# Patient Record
Sex: FEMALE | Race: BLACK OR AFRICAN AMERICAN | NOT HISPANIC OR LATINO | Employment: OTHER | URBAN - METROPOLITAN AREA
[De-identification: names, ages, dates, MRNs, and addresses within clinical notes are randomized per-mention and may not be internally consistent; named-entity substitution may affect disease eponyms.]

---

## 2023-09-21 DIAGNOSIS — D69.6 THROMBOCYTOPENIA: Primary | ICD-10-CM

## 2023-12-19 ENCOUNTER — OFFICE VISIT (OUTPATIENT)
Dept: HEMATOLOGY/ONCOLOGY | Facility: CLINIC | Age: 88
End: 2023-12-19
Payer: COMMERCIAL

## 2023-12-19 VITALS
TEMPERATURE: 98 F | HEART RATE: 78 BPM | WEIGHT: 163 LBS | BODY MASS INDEX: 28.88 KG/M2 | DIASTOLIC BLOOD PRESSURE: 75 MMHG | RESPIRATION RATE: 15 BRPM | OXYGEN SATURATION: 100 % | HEIGHT: 63 IN | SYSTOLIC BLOOD PRESSURE: 120 MMHG

## 2023-12-19 DIAGNOSIS — D69.6 THROMBOCYTOPENIA: ICD-10-CM

## 2023-12-19 DIAGNOSIS — B15.9 VIRAL HEPATITIS A WITHOUT HEPATIC COMA: Primary | ICD-10-CM

## 2023-12-19 DIAGNOSIS — Z86.2 HISTORY OF ANEMIA DUE TO CHRONIC KIDNEY DISEASE: ICD-10-CM

## 2023-12-19 DIAGNOSIS — N18.9 HISTORY OF ANEMIA DUE TO CHRONIC KIDNEY DISEASE: ICD-10-CM

## 2023-12-19 PROCEDURE — 1160F PR REVIEW ALL MEDS BY PRESCRIBER/CLIN PHARMACIST DOCUMENTED: ICD-10-PCS | Mod: CPTII,,, | Performed by: NURSE PRACTITIONER

## 2023-12-19 PROCEDURE — 1101F PR PT FALLS ASSESS DOC 0-1 FALLS W/OUT INJ PAST YR: ICD-10-PCS | Mod: CPTII,,, | Performed by: NURSE PRACTITIONER

## 2023-12-19 PROCEDURE — 3288F PR FALLS RISK ASSESSMENT DOCUMENTED: ICD-10-PCS | Mod: CPTII,,, | Performed by: NURSE PRACTITIONER

## 2023-12-19 PROCEDURE — 1159F PR MEDICATION LIST DOCUMENTED IN MEDICAL RECORD: ICD-10-PCS | Mod: CPTII,,, | Performed by: NURSE PRACTITIONER

## 2023-12-19 PROCEDURE — 3288F FALL RISK ASSESSMENT DOCD: CPT | Mod: CPTII,,, | Performed by: NURSE PRACTITIONER

## 2023-12-19 PROCEDURE — 99205 OFFICE O/P NEW HI 60 MIN: CPT | Mod: ,,, | Performed by: NURSE PRACTITIONER

## 2023-12-19 PROCEDURE — 1160F RVW MEDS BY RX/DR IN RCRD: CPT | Mod: CPTII,,, | Performed by: NURSE PRACTITIONER

## 2023-12-19 PROCEDURE — 1159F MED LIST DOCD IN RCRD: CPT | Mod: CPTII,,, | Performed by: NURSE PRACTITIONER

## 2023-12-19 PROCEDURE — 1126F AMNT PAIN NOTED NONE PRSNT: CPT | Mod: CPTII,,, | Performed by: NURSE PRACTITIONER

## 2023-12-19 PROCEDURE — 1101F PT FALLS ASSESS-DOCD LE1/YR: CPT | Mod: CPTII,,, | Performed by: NURSE PRACTITIONER

## 2023-12-19 PROCEDURE — 99205 PR OFFICE/OUTPT VISIT, NEW, LEVL V, 60-74 MIN: ICD-10-PCS | Mod: ,,, | Performed by: NURSE PRACTITIONER

## 2023-12-19 PROCEDURE — 1126F PR PAIN SEVERITY QUANTIFIED, NO PAIN PRESENT: ICD-10-PCS | Mod: CPTII,,, | Performed by: NURSE PRACTITIONER

## 2023-12-19 RX ORDER — FAMOTIDINE 40 MG/1
40 TABLET, FILM COATED ORAL DAILY
COMMUNITY
Start: 2023-08-17

## 2023-12-19 RX ORDER — ATORVASTATIN CALCIUM 20 MG/1
20 TABLET, FILM COATED ORAL NIGHTLY
COMMUNITY

## 2023-12-19 RX ORDER — ASPIRIN 325 MG
325 TABLET ORAL
COMMUNITY

## 2023-12-19 RX ORDER — NITROGLYCERIN 0.4 MG/1
0.4 TABLET SUBLINGUAL
COMMUNITY

## 2023-12-19 RX ORDER — CETIRIZINE HYDROCHLORIDE 10 MG/1
10 TABLET ORAL
COMMUNITY

## 2023-12-19 RX ORDER — CALCIUM ACETATE 667 MG/1
667 CAPSULE ORAL 3 TIMES DAILY
COMMUNITY
Start: 2023-09-27

## 2023-12-19 RX ORDER — HYDRALAZINE HYDROCHLORIDE 100 MG/1
100 TABLET, FILM COATED ORAL DAILY
COMMUNITY

## 2023-12-19 RX ORDER — DEXLANSOPRAZOLE 60 MG/1
1 CAPSULE, DELAYED RELEASE ORAL DAILY
COMMUNITY

## 2023-12-19 RX ORDER — FOLIC ACID/VIT B COMPLEX AND C 0.8 MG
0.8 TABLET ORAL DAILY
COMMUNITY

## 2023-12-19 NOTE — PROGRESS NOTES
Referring provider:    Dr. Burrows    Reason for consultation:    Thrombocytopenia.    Diagnosis:    Thrombocytopenia.    HPI:    Ms. Schultz is a 88-year-old female referred to our clinic for evaluation of thrombocytopenia.  Review of records reveal low platelet count in July (71,000), August (101,000), and September (66,000 to 70,000) of 2023 with upward trend in October 2023 to 126,000.  White blood count was low in July (2.91) August (3.53) 2023.  White blood count normalized in September 2023 to 4.66.  Patient was diagnosed and treated for hepatitis A a couple of months ago with repeat hepatitis A still in the grey zone at the end of October 2023.  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  Her last EGD on 8/31/2023 for complaint of melena showed hiatal hernia and gastritis.  She was placed on Dexlansoprazole for gastritis.    Interval history:    Ms. Schultz is here today for consultation regarding thrombocytopenia.  Today, she reports doing well.  She denies any epistaxis, hemoptysis, hematochezia, hematuria, melena, petechiae, or excessive bruising.  She reports recent diagnosis of hepatitis A and Chris Vera.  She reports diarrhea and abdominal pain have subsided.  She denies any fever, chills, SOB, chest pain, abdominal pain, jaundice, regular bowel movement with dark stools due to oral iron, voiding clear yellow urine, no new joint/muscle/bone pain, no lower extremity swelling, and no peripheral neuropathy.  Patient denies any autoimmune disorders, family history of thrombocytopenia, B 12, or folate deficiencies.  She denies any weight loss.  Eating and drinking well.      Family history:    Mother - Colon Cancer.      Past Medical History:   Diagnosis Date    Anemia     Arthritis     Breast cancer     Cataract     CHF (congestive heart failure)     Diastolic heart failure, unspecified HF chronicity     End stage renal disease     Hyperparathyroidism,  unspecified     Hypertension     Mixed hyperlipidemia     Unspecified sensorineural hearing loss        Past Surgical History:   Procedure Laterality Date    BREAST SURGERY Right     GALLBLADDER SURGERY      HYSTERECTOMY         Family History   Problem Relation Age of Onset    Stomach cancer Mother     Diabetes Mother     Brain cancer Father        Social History     Socioeconomic History    Marital status:    Tobacco Use    Smoking status: Never    Smokeless tobacco: Never   Substance and Sexual Activity    Alcohol use: Not Currently    Drug use: Never       Current Outpatient Medications   Medication Sig Dispense Refill    aspirin 325 MG tablet Take 325 mg by mouth as needed.      atorvastatin (LIPITOR) 20 MG tablet Take 20 mg by mouth nightly.      B complex-vitamin C-folic acid (NEPHRO-ALONZO) 0.8 mg Tab Take 0.8 mg by mouth Daily.      calcium acetate,phosphat bind, (PHOSLO) 667 mg capsule Take 667 mg by mouth 3 (three) times daily.      cetirizine (ZYRTEC) 10 MG tablet Take 10 mg by mouth as needed.      dexlansoprazole (DEXILANT) 60 mg capsule Take 1 capsule by mouth Daily.      famotidine (PEPCID) 40 MG tablet Take 40 mg by mouth Daily.      hydrALAZINE (APRESOLINE) 100 MG tablet Take 100 mg by mouth once daily.      nitroGLYCERIN (NITROSTAT) 0.4 MG SL tablet Place 0.4 mg under the tongue as needed.       No current facility-administered medications for this visit.       Review of patient's allergies indicates:   Allergen Reactions    Benazepril Swelling    Lisinopril Swelling         Review of systems  CONSTITUTIONAL: no fevers, no chills, no weight loss, no fatigue, no weakness  HEMATOLOGIC: no abnormal bleeding, no abnormal bruising, no drenching night sweats  ONCOLOGIC: no new masses or lumps  HEENT: no vision loss, no tinnitus or hearing loss, no nose bleeding, no dysphagia, no odynophagia  CVS: no chest pain, no palpitations, no dyspnea on exertion  RESP: no shortness of breath, no hemoptysis, no  cough  GI: no nausea, no vomiting, no diarrhea, no constipation, no melena, no hematochezia, no hematemesis, no abdominal pain, no increase in abdominal girth  : no dysuria, no hematuria, no hesitancy, no scrotal swelling, no discharge  INTEGUMENT: no rashes, no abnormal bruising, no nail pitting, no hyperpigmentation  NEURO: no falls, no memory loss, no paresthesias or dysesthesias, no urofecal incontinence or retention, no loss of strength on any extremity  MSK: no back pain, no new joint pain, no joint swelling  PSYCH: no suicidal or homicidal ideation, no depression, no insomnia, no anhedonia  ENDOCRINE: no heat or cold intolerance, no polyuria, no polydipsia      Physical Exam:  Vitals:    12/19/23 1123   BP: 120/75   Pulse: 78   Resp: 15   Temp: 98.2 °F (36.8 °C)       ECOG PS 0  GA: AAOx3, NAD  HEENT: NCAT, PERRLA, EOMI, good dentition, moist oral mucous membranes  LYMPH: no cervical, axillary or supraclavicular adenopathy  CVS: s1s2 RRR, no M/R/G  RESP: CTA b/l, no crackles, no wheezes or rhonchi  ABD: soft, NT, ND, BS+, no hepatosplenomegaly  EXT: no deformities, no pedal edema  SKIN: no rashes, warm and dry  NEURO: normal mentation, strength 5/5 on all 4 extremities, no sensory deficits        Assessment    Thrombocytopenia, D 69.6  Recent diagnosis of hepatitis A and Chris Vera over the last couple of months.    11/23/2023 Hepatitis A Antibody Total  Positive.  Platelet count in September 2023 - 66,000 and trending upward in October 2023 - 126,000.  Low platelet count probably related to hepatitis A and Chris Vera diagnoses.    2.  Anemia chronic disease, ESRD, D63.1  Hemoglobin in the high 10's to ll's.  Patient receives dialysis 3 days a week.  Creatinine 9.26 in October 2023.        Plan     Will send patient for labs today to include:  CBC with diff, CMP, peripheral smear, B 12, and folate.  Will order ultrasound of the abdomen to assess liver and spleen.  Advised patient to contact our office  for any abnormal bleeding, excessive bruising, and jaundice.  Continue to follow with nephrology for ESRD and dialysis.  Continue to follow with GI and PCP regarding management of hepatitis A and gastritis.  Will have patient return to our office in 3 weeks to discuss labs and ultrasound of the abdomen, and repeat CBC with differential and CMP.        A total of  60 minutes were spent in review of records, interpretation of test, coordination of care, discussion and counseling with the patient.        The patient is in agreement with today's plan of care.  All questions answered.  Patient is aware to contact our office for any problems or concerns prior to next visit.      Gillian Frost, MSN-ED, APRN, AGACNP-BC, OCN

## 2024-01-08 NOTE — PROGRESS NOTES
Referring provider:    Dr. Burrows    Reason for consultation:    Thrombocytopenia.    Diagnosis:    Thrombocytopenia.    HPI:    Ms. Schultz is a 88-year-old female referred to our clinic for evaluation of thrombocytopenia.  Review of records reveal low platelet count in July (71,000), August (101,000), and September (66,000 to 70,000) of 2023 with upward trend in October 2023 to 126,000.  White blood count was low in July (2.91) August (3.53) 2023.  White blood count normalized in September 2023 to 4.66.  Patient was diagnosed and treated for hepatitis A a couple of months ago with repeat hepatitis A still in the grey zone at the end of October 2023.  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  Her last EGD on 8/31/2023 for complaint of melena showed hiatal hernia and gastritis.  She was placed on Dexlansoprazole for gastritis.    Interval history:    1/9/2024:  Ms. Schultz is here today for follow-up and lab results from her initial visit of 12/19/2023 regarding thrombocytopenia.  Today, she reports doing well.  She denies any epistaxis, hemoptysis, hematochezia, hematuria, melena, petechiae, or excessive bruising.  She denies any fever, chills, SOB, chest pain, abdominal pain, jaundice, regular bowel movement with dark stools due to oral iron, voiding clear yellow urine, no new joint/muscle/bone pain, no lower extremity swelling, and no peripheral neuropathy.  We discussed labs dated 12/19/2023 and 1/02/2024:  12/19/2023:  Peripheral blood smear WBCs adequate with mild absolute eosinophilla.  Adequate macrocytic/normochromic red blood cell population slightly decrease in number.  Mild thrombocytopenia.  No circulating blasts.  Creatinine 6.03, BUN 22.07, Liver enzymes WNL, folic acid 65.16, Vitamin B12 (796), WBC 7.12, Hgb 112.2, Hct 37.9, Plt 123,000, 1/02/2024:  Creatinine 6.55, BUN 30.13, liver enzymes WNL, WBC 6.34, Hgb 11.0, Hct 34.2, Plt 91,000.  Weight is  stable.  Eating and drinking well.      Family history:    Mother - Colon Cancer.      Past Medical History:   Diagnosis Date    Anemia     Arthritis     Breast cancer     Cataract     CHF (congestive heart failure)     Diastolic heart failure, unspecified HF chronicity     End stage renal disease     Hyperparathyroidism, unspecified     Hypertension     Mixed hyperlipidemia     Unspecified sensorineural hearing loss        Past Surgical History:   Procedure Laterality Date    BREAST SURGERY Right     GALLBLADDER SURGERY      HYSTERECTOMY         Family History   Problem Relation Age of Onset    Stomach cancer Mother     Diabetes Mother     Brain cancer Father        Social History     Socioeconomic History    Marital status:    Tobacco Use    Smoking status: Never    Smokeless tobacco: Never   Substance and Sexual Activity    Alcohol use: Not Currently    Drug use: Never       Current Outpatient Medications   Medication Sig Dispense Refill    aspirin 325 MG tablet Take 325 mg by mouth as needed.      atorvastatin (LIPITOR) 20 MG tablet Take 20 mg by mouth nightly.      B complex-vitamin C-folic acid (NEPHRO-ALONZO) 0.8 mg Tab Take 0.8 mg by mouth Daily.      calcium acetate,phosphat bind, (PHOSLO) 667 mg capsule Take 667 mg by mouth 3 (three) times daily.      cetirizine (ZYRTEC) 10 MG tablet Take 10 mg by mouth as needed.      dexlansoprazole (DEXILANT) 60 mg capsule Take 1 capsule by mouth Daily.      famotidine (PEPCID) 40 MG tablet Take 40 mg by mouth Daily.      hydrALAZINE (APRESOLINE) 100 MG tablet Take 100 mg by mouth once daily.      methoxy peg-epoetin beta (MIRCERA INJ) Take 30 mcg by mouth as needed.      nitroGLYCERIN (NITROSTAT) 0.4 MG SL tablet Place 0.4 mg under the tongue as needed.       No current facility-administered medications for this visit.       Review of patient's allergies indicates:   Allergen Reactions    Benazepril Swelling    Lisinopril Swelling     Labs:  1/02/2024:  Creatinine  6.55, BUN 30.13, liver enzymes WNL, WBC 6.34, Hgb 11.0, Hct 34.2, Plt 91,000.     12/19/2023:  Peripheral blood smear WBCs adequate with mild absolute eosinophilla.  Adequate macrocytic/normochromic red blood cell population slightly decrease in number.  Mild thrombocytopenia.  No circulating blasts.  Creatinine 6.03, BUN 22.07, Liver enzymes WNL, folic acid 65.16, Vitamin B12 (796), WBC 7.12, Hgb 112.2, Hct 37.9, Plt 123,000,    Review of systems  CONSTITUTIONAL: no fevers, no chills, no weight loss, no fatigue, no weakness  HEMATOLOGIC: no abnormal bleeding, no abnormal bruising, no drenching night sweats  ONCOLOGIC: no new masses or lumps  HEENT: no vision loss, no tinnitus or hearing loss, no nose bleeding, no dysphagia, no odynophagia  CVS: no chest pain, no palpitations, no dyspnea on exertion  RESP: no shortness of breath, no hemoptysis, no cough  GI: no nausea, no vomiting, no diarrhea, no constipation, no melena, no hematochezia, no hematemesis, no abdominal pain, no increase in abdominal girth  : no dysuria, no hematuria, no hesitancy, no scrotal swelling, no discharge  INTEGUMENT: no rashes, no abnormal bruising, no nail pitting, no hyperpigmentation  NEURO: no falls, no memory loss, no paresthesias or dysesthesias, no urofecal incontinence or retention, no loss of strength on any extremity  MSK: no back pain, no new joint pain, no joint swelling  PSYCH: no suicidal or homicidal ideation, no depression, no insomnia, no anhedonia  ENDOCRINE: no heat or cold intolerance, no polyuria, no polydipsia      Physical Exam:  Vitals:    01/09/24 1450   BP: (!) 147/81   Pulse: 80   Resp: 20   Temp: 98.3 °F (36.8 °C)         ECOG PS 0  GA: AAOx3, NAD  HEENT: NCAT, PERRLA, EOMI, good dentition, moist oral mucous membranes  LYMPH: no cervical, axillary or supraclavicular adenopathy  CVS: s1s2 RRR, no M/R/G  RESP: CTA b/l, no crackles, no wheezes or rhonchi  ABD: soft, NT, ND, BS+, no hepatosplenomegaly  EXT: no  deformities, no pedal edema  SKIN: no rashes, warm and dry  NEURO: normal mentation, strength 5/5 on all 4 extremities, no sensory deficits        Assessment    Thrombocytopenia, D 69.6  Recent diagnosis of hepatitis A and Chris Vera over the last couple of months.    11/23/2023 Hepatitis A Antibody Total  Positive.  Platelet count in September 2023 - 66,000 and trending upward in October 2023 - 126,000.  Platelet count 12/19/2023 - 123,000 and 1/2/2024 - 91,000  US of the abdomen pending from visit of 1/9/2024.   Splenomegaly possible cause of thrombocytopenia.    2.  Anemia chronic disease, ESRD, N18.9 Z86.2  Hemoglobin in the high 10's to ll's.  Patient receives dialysis 3 days a week.  Creatinine 9.26 in October 2023.    Creatinine 12/19/2023 - 6.03, Creatinine 1/2/2024 - 6.55.    Hemoglobin 12/19/2023 - 12.2, Hemoglobin 1/2/2024 - 11.0.    Plan     Ultrasound of the abdomen to assess liver and spleen pending from 1/9/2024 visit.  2.   Advised patient to contact our office for any abnormal bleeding, excessive bruising, and jaundice.  3.   Continue to follow with nephrology for ESRD and dialysis.  4.   Continue to follow with GI and PCP regarding management of hepatitis A and gastritis.  5.   Will have patient return to our office in 3 months for follow-up, multiple myeloma panel, CBC, CMP, folate, and Vitamin B12, and to discuss labs and ultrasound of the abdomen.      The patient is in agreement with today's plan of care.  All questions answered.  Patient is aware to contact our office for any problems or concerns prior to next visit.      Gillian Frost, MSN-ED, APRN, AGACNP-BC, OCN

## 2024-01-09 ENCOUNTER — OFFICE VISIT (OUTPATIENT)
Dept: HEMATOLOGY/ONCOLOGY | Facility: CLINIC | Age: 89
End: 2024-01-09
Payer: COMMERCIAL

## 2024-01-09 VITALS
RESPIRATION RATE: 20 BRPM | OXYGEN SATURATION: 96 % | BODY MASS INDEX: 28.7 KG/M2 | HEART RATE: 80 BPM | DIASTOLIC BLOOD PRESSURE: 81 MMHG | TEMPERATURE: 98 F | WEIGHT: 162 LBS | SYSTOLIC BLOOD PRESSURE: 147 MMHG | HEIGHT: 63 IN

## 2024-01-09 DIAGNOSIS — N18.9 HISTORY OF ANEMIA DUE TO CHRONIC KIDNEY DISEASE: ICD-10-CM

## 2024-01-09 DIAGNOSIS — N18.6 ESRD ON HEMODIALYSIS: ICD-10-CM

## 2024-01-09 DIAGNOSIS — Z99.2 ESRD ON HEMODIALYSIS: ICD-10-CM

## 2024-01-09 DIAGNOSIS — Z86.2 HISTORY OF ANEMIA DUE TO CHRONIC KIDNEY DISEASE: ICD-10-CM

## 2024-01-09 DIAGNOSIS — D69.6 THROMBOCYTOPENIA: Primary | ICD-10-CM

## 2024-01-09 PROCEDURE — 1125F AMNT PAIN NOTED PAIN PRSNT: CPT | Mod: CPTII,,, | Performed by: NURSE PRACTITIONER

## 2024-01-09 PROCEDURE — 99213 OFFICE O/P EST LOW 20 MIN: CPT | Mod: ,,, | Performed by: NURSE PRACTITIONER

## 2024-01-09 PROCEDURE — 1159F MED LIST DOCD IN RCRD: CPT | Mod: CPTII,,, | Performed by: NURSE PRACTITIONER

## 2024-01-09 PROCEDURE — 3288F FALL RISK ASSESSMENT DOCD: CPT | Mod: CPTII,,, | Performed by: NURSE PRACTITIONER

## 2024-01-09 PROCEDURE — 1160F RVW MEDS BY RX/DR IN RCRD: CPT | Mod: CPTII,,, | Performed by: NURSE PRACTITIONER

## 2024-01-09 PROCEDURE — 1101F PT FALLS ASSESS-DOCD LE1/YR: CPT | Mod: CPTII,,, | Performed by: NURSE PRACTITIONER

## 2024-04-25 ENCOUNTER — OFFICE VISIT (OUTPATIENT)
Dept: HEMATOLOGY/ONCOLOGY | Facility: CLINIC | Age: 89
End: 2024-04-25
Payer: COMMERCIAL

## 2024-04-25 VITALS
RESPIRATION RATE: 18 BRPM | WEIGHT: 164 LBS | SYSTOLIC BLOOD PRESSURE: 138 MMHG | DIASTOLIC BLOOD PRESSURE: 68 MMHG | HEIGHT: 63 IN | TEMPERATURE: 99 F | BODY MASS INDEX: 29.06 KG/M2 | OXYGEN SATURATION: 99 % | HEART RATE: 77 BPM

## 2024-04-25 DIAGNOSIS — D69.6 THROMBOCYTOPENIA: ICD-10-CM

## 2024-04-25 DIAGNOSIS — N18.6 ESRD ON HEMODIALYSIS: Primary | ICD-10-CM

## 2024-04-25 DIAGNOSIS — N18.9 HISTORY OF ANEMIA DUE TO CHRONIC KIDNEY DISEASE: ICD-10-CM

## 2024-04-25 DIAGNOSIS — Z99.2 ESRD ON HEMODIALYSIS: Primary | ICD-10-CM

## 2024-04-25 DIAGNOSIS — Z86.2 HISTORY OF ANEMIA DUE TO CHRONIC KIDNEY DISEASE: ICD-10-CM

## 2024-04-25 PROCEDURE — 99213 OFFICE O/P EST LOW 20 MIN: CPT | Mod: ,,, | Performed by: NURSE PRACTITIONER

## 2024-04-25 PROCEDURE — 1159F MED LIST DOCD IN RCRD: CPT | Mod: CPTII,,, | Performed by: NURSE PRACTITIONER

## 2024-04-25 PROCEDURE — 1101F PT FALLS ASSESS-DOCD LE1/YR: CPT | Mod: CPTII,,, | Performed by: NURSE PRACTITIONER

## 2024-04-25 PROCEDURE — 3288F FALL RISK ASSESSMENT DOCD: CPT | Mod: CPTII,,, | Performed by: NURSE PRACTITIONER

## 2024-04-25 PROCEDURE — 1126F AMNT PAIN NOTED NONE PRSNT: CPT | Mod: CPTII,,, | Performed by: NURSE PRACTITIONER

## 2024-04-25 PROCEDURE — 1160F RVW MEDS BY RX/DR IN RCRD: CPT | Mod: CPTII,,, | Performed by: NURSE PRACTITIONER

## 2024-04-25 RX ORDER — RENO CAPS 100; 1.5; 1.7; 20; 10; 1; 150; 5; 6 MG/1; MG/1; MG/1; MG/1; MG/1; MG/1; UG/1; MG/1; UG/1
1 CAPSULE ORAL DAILY
COMMUNITY
Start: 2024-02-21

## 2024-04-25 RX ORDER — HYDRALAZINE HYDROCHLORIDE 50 MG/1
1 TABLET, FILM COATED ORAL 3 TIMES DAILY
COMMUNITY

## 2024-04-25 RX ORDER — LANSOPRAZOLE 30 MG/1
30 CAPSULE, DELAYED RELEASE ORAL DAILY
COMMUNITY
Start: 2024-02-23

## 2024-04-25 RX ORDER — METOPROLOL TARTRATE 25 MG/1
1 TABLET, FILM COATED ORAL 2 TIMES DAILY
COMMUNITY

## 2024-04-25 RX ORDER — POTASSIUM CHLORIDE 750 MG/1
10 CAPSULE, EXTENDED RELEASE ORAL DAILY
COMMUNITY

## 2024-04-25 NOTE — PROGRESS NOTES
Referring provider:    Dr. Burrows    Reason for consultation:    Thrombocytopenia.    Diagnosis:    Thrombocytopenia.    HPI:    Ms. Schultz is a 88-year-old female referred to our clinic for evaluation of thrombocytopenia.  Review of records reveal low platelet count in July (71,000), August (101,000), and September (66,000 to 70,000) of 2023 with upward trend in October 2023 to 126,000.  White blood count was low in July (2.91) August (3.53) 2023.  White blood count normalized in September 2023 to 4.66.  Patient was diagnosed and treated for hepatitis A a couple of months ago with repeat hepatitis A still in the grey zone at the end of October 2023.  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  Her last EGD on 8/31/2023 for complaint of melena showed hiatal hernia and gastritis.  She was placed on Dexlansoprazole for gastritis.    Interval history:    4/25/2024:  Ms. Schultz is here today for follow-up and lab results regarding thrombocytopenia.  Today, she reports doing well.  She denies any epistaxis, hemoptysis, hematochezia, hematuria, melena, petechiae, or excessive bruising.  She denies any fever, chills, SOB, chest pain, abdominal pain, jaundice, regular bowel movement with dark stools due to oral iron, voiding clear yellow urine, no new joint/muscle/bone pain, no lower extremity swelling, and no peripheral neuropathy.  Labs reviewed with patient dated 4/11/2024:  Creatinine 6.64, BUN 35.52, T. Bili WNL AST 35, ALT 36, , folic acide 14.03, Vitamin B12 741, WBC 4.23, Hgb 10.9, Hct 34.1, .3, Plt 102,000, SPEP with DESIRE IgG 1552, IgA 136, IgM 192, M-Terrell not observed, No evidence of monoclonal protein, Beta 2 Microglobulin and serum viscosity pending. Weight is stable.  Eating and drinking well.      Family history:    Mother - Colon Cancer.      Past Medical History:   Diagnosis Date    Anemia     Arthritis     Breast cancer     Cataract     CHF  (congestive heart failure)     Diastolic heart failure, unspecified HF chronicity     End stage renal disease     Hyperparathyroidism, unspecified     Hypertension     Mixed hyperlipidemia     Unspecified sensorineural hearing loss        Past Surgical History:   Procedure Laterality Date    BREAST SURGERY Right     GALLBLADDER SURGERY      HYSTERECTOMY         Family History   Problem Relation Name Age of Onset    Stomach cancer Mother      Diabetes Mother      Brain cancer Father         Social History     Socioeconomic History    Marital status:    Tobacco Use    Smoking status: Never    Smokeless tobacco: Never   Substance and Sexual Activity    Alcohol use: Not Currently    Drug use: Never       Current Outpatient Medications   Medication Sig Dispense Refill    aspirin 325 MG tablet Take 325 mg by mouth as needed.      atorvastatin (LIPITOR) 20 MG tablet Take 20 mg by mouth nightly.      B complex-vitamin C-folic acid (NEPHRO-ALONZO) 0.8 mg Tab Take 0.8 mg by mouth Daily.      calcium acetate,phosphat bind, (PHOSLO) 667 mg capsule Take 667 mg by mouth 3 (three) times daily.      cetirizine (ZYRTEC) 10 MG tablet Take 10 mg by mouth as needed.      dexlansoprazole (DEXILANT) 60 mg capsule Take 1 capsule by mouth Daily.      famotidine (PEPCID) 40 MG tablet Take 40 mg by mouth Daily.      hydrALAZINE (APRESOLINE) 50 MG tablet Take 1 tablet by mouth 3 (three) times daily.      lansoprazole (PREVACID) 30 MG capsule Take 30 mg by mouth once daily.      methoxy peg-epoetin beta (MIRCERA INJ) Take 30 mcg by mouth as needed.      metoprolol tartrate (LOPRESSOR) 25 MG tablet Take 1 tablet by mouth 2 (two) times daily.      nitroGLYCERIN (NITROSTAT) 0.4 MG SL tablet Place 0.4 mg under the tongue as needed.      potassium chloride (MICRO-K) 10 MEQ CpSR Take 10 mEq by mouth once daily.      JOSEPH CAPS 1 mg Cap Take 1 capsule by mouth once daily.       No current facility-administered medications for this visit.        Review of patient's allergies indicates:   Allergen Reactions    Ace inhibitors Anaphylaxis, Other (See Comments) and Swelling    Benazepril Swelling    Lisinopril Swelling    Vancomycin Other (See Comments)     Labs:  2024:  Creatinine 6.64, BUN 35.52, T. Bili WNL AST 35, ALT 36, , folic acide 14.03, Vitamin B12 741, WBC 4.23, Hgb 10.9, Hct 34.1, .3, Plt 102,000, SPEP with DESIRE IgG 1552, IgA 136, IgM 192, M-Terrell not observed, No evidence of monoclonal protein, Beta 2 Microglobulin and serum viscosity pending  2024:  Creatinine 6.55, BUN 30.13, liver enzymes WNL, WBC 6.34, Hgb 11.0, Hct 34.2, Plt 91,000.     2023:  Peripheral blood smear WBCs adequate with mild absolute eosinophilla.  Adequate macrocytic/normochromic red blood cell population slightly decrease in number.  Mild thrombocytopenia.  No circulating blasts.  Creatinine 6.03, BUN 22.07, Liver enzymes WNL, folic acid 65.16, Vitamin B12 (796), WBC 7.12, Hgb 112.2, Hct 37.9, Plt 123,000.    Imagin2024:  Pancreas is normal.  Liver is normal.  Right renal cysts.     Review of systems  CONSTITUTIONAL: no fevers, no chills, no weight loss, no fatigue, no weakness  HEMATOLOGIC: no abnormal bleeding, no abnormal bruising, no drenching night sweats  ONCOLOGIC: no new masses or lumps  HEENT: no vision loss, no tinnitus or hearing loss, no nose bleeding, no dysphagia, no odynophagia  CVS: no chest pain, no palpitations, no dyspnea on exertion  RESP: no shortness of breath, no hemoptysis, no cough  GI: no nausea, no vomiting, no diarrhea, no constipation, no melena, no hematochezia, no hematemesis, no abdominal pain, no increase in abdominal girth  : no dysuria, no hematuria, no hesitancy, no scrotal swelling, no discharge  INTEGUMENT: no rashes, no abnormal bruising, no nail pitting, no hyperpigmentation  NEURO: no falls, no memory loss, no paresthesias or dysesthesias, no urofecal incontinence or retention, no loss of  strength on any extremity  MSK: no back pain, no new joint pain, no joint swelling  PSYCH: no suicidal or homicidal ideation, no depression, no insomnia, no anhedonia  ENDOCRINE: no heat or cold intolerance, no polyuria, no polydipsia      Physical Exam:  Vitals:    04/25/24 1408   BP: 138/68   Pulse: 77   Resp: 18   Temp: 98.7 °F (37.1 °C)           ECOG PS 0  GA: AAOx3, NAD  HEENT: NCAT, PERRLA, EOMI, good dentition, moist oral mucous membranes  LYMPH: no cervical, axillary or supraclavicular adenopathy  CVS: s1s2 RRR, no M/R/G  RESP: CTA b/l, no crackles, no wheezes or rhonchi  ABD: soft, NT, ND, BS+, no hepatosplenomegaly  EXT: no deformities, no pedal edema  SKIN: no rashes, warm and dry  NEURO: normal mentation, strength 5/5 on all 4 extremities, no sensory deficits        Assessment    Thrombocytopenia, D 69.6  Recent diagnosis of hepatitis A and Chris Vera over the last couple of months.    11/23/2023 Hepatitis A Antibody Total  Positive.  Platelet count in September 2023 - 66,000 and trending upward in October 2023 - 126,000.  Platelet count 12/19/2023 - 123,000 and 1/2/2024 - 91,000, 4/11/2024 - 102,000      2.  Anemia chronic disease, ESRD, N18.9 Z86.2  Hemoglobin in the high 10's to ll's.  Patient receives dialysis 3 days a week.  Creatinine 9.26 in October 2023.    Creatinine 12/19/2023 - 6.03, Creatinine 1/2/2024 - 6.55.    Hemoglobin 12/19/2023 - 12.2, Hemoglobin 1/2/2024 - 11.0.   Hemogloblin 10.6 (4/11/2024).    Plan     Ultrasound of the abdomen to assess liver and spleen will repeat at next visit.  RUQ US only?.  2.   Advised patient to contact our office for any abnormal bleeding, excessive bruising, and jaundice.  3.   Continue to follow with nephrology for ESRD and dialysis.  4.   Continue to follow with GI and PCP regarding management of hepatitis A and gastritis.  5.   Will have patient return to our office in 6 months for follow-up, CBC and  CMP.      The patient is in agreement with  today's plan of care.  All questions answered.  Patient is aware to contact our office for any problems or concerns prior to next visit.      Gillian Frost, MSN-ED, APRN, AGACNP-BC, OCN

## 2024-12-03 ENCOUNTER — OFFICE VISIT (OUTPATIENT)
Dept: HEMATOLOGY/ONCOLOGY | Facility: CLINIC | Age: 89
End: 2024-12-03
Payer: COMMERCIAL

## 2024-12-03 VITALS
RESPIRATION RATE: 18 BRPM | TEMPERATURE: 98 F | OXYGEN SATURATION: 99 % | SYSTOLIC BLOOD PRESSURE: 149 MMHG | HEIGHT: 63 IN | HEART RATE: 67 BPM | DIASTOLIC BLOOD PRESSURE: 76 MMHG | BODY MASS INDEX: 29 KG/M2 | WEIGHT: 163.69 LBS

## 2024-12-03 DIAGNOSIS — D69.6 THROMBOCYTOPENIA: Primary | ICD-10-CM

## 2024-12-03 DIAGNOSIS — D64.9 ANEMIA, UNSPECIFIED TYPE: ICD-10-CM

## 2024-12-03 PROCEDURE — 99214 OFFICE O/P EST MOD 30 MIN: CPT | Mod: ,,, | Performed by: STUDENT IN AN ORGANIZED HEALTH CARE EDUCATION/TRAINING PROGRAM

## 2024-12-03 PROCEDURE — 1126F AMNT PAIN NOTED NONE PRSNT: CPT | Mod: CPTII,,, | Performed by: STUDENT IN AN ORGANIZED HEALTH CARE EDUCATION/TRAINING PROGRAM

## 2024-12-03 PROCEDURE — 1159F MED LIST DOCD IN RCRD: CPT | Mod: CPTII,,, | Performed by: STUDENT IN AN ORGANIZED HEALTH CARE EDUCATION/TRAINING PROGRAM

## 2024-12-03 RX ORDER — METOPROLOL TARTRATE 50 MG/1
1 TABLET ORAL 2 TIMES DAILY
COMMUNITY
Start: 2024-07-10

## 2024-12-03 RX ORDER — MIDODRINE HYDROCHLORIDE 10 MG/1
10 TABLET ORAL
COMMUNITY
Start: 2024-08-16

## 2024-12-03 NOTE — PROGRESS NOTES
Referring provider:    Dr. Burrows    Reason for consultation:    Thrombocytopenia.        HPI:    Ms. Schultz is a 88-year-old female referred to our clinic for evaluation of thrombocytopenia.  Review of records reveal low platelet count in July (71,000), August (101,000), and September (66,000 to 70,000) of 2023 with upward trend in October 2023 to 126,000.  White blood count was low in July (2.91) August (3.53) 2023.  White blood count normalized in September 2023 to 4.66.  Patient was diagnosed and treated for hepatitis A a couple of months ago with repeat hepatitis A still in the grey zone at the end of October 2023.  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  Her last EGD on 8/31/2023 for complaint of melena showed hiatal hernia and gastritis.  She was placed on Dexlansoprazole for gastritis.    Interval history:    Today, 12/03/2024, patient denies any acute concerns today she denies any fevers, chills, night sweats, decreased appetite or weight loss.  She denies any new medications, ER or hospital visits since last follow-up with us.  She denies any blood in his stools, dark tarry stools, easy bruising or bleeding.      Past Medical History:   Diagnosis Date    Anemia     Arthritis     Breast cancer     Cataract     CHF (congestive heart failure)     Diastolic heart failure, unspecified HF chronicity     End stage renal disease     Hyperparathyroidism, unspecified     Hypertension     Mixed hyperlipidemia     Unspecified sensorineural hearing loss        Past Surgical History:   Procedure Laterality Date    BREAST SURGERY Right     GALLBLADDER SURGERY      HYSTERECTOMY         Family History   Problem Relation Name Age of Onset    Stomach cancer Mother      Diabetes Mother      Brain cancer Father         Social History     Socioeconomic History    Marital status:    Tobacco Use    Smoking status: Never    Smokeless tobacco: Never   Substance and Sexual  Activity    Alcohol use: Not Currently    Drug use: Never       Current Outpatient Medications   Medication Sig Dispense Refill    aspirin 325 MG tablet Take 325 mg by mouth as needed.      atorvastatin (LIPITOR) 20 MG tablet Take 20 mg by mouth nightly.      B complex-vitamin C-folic acid (NEPHRO-ALONZO) 0.8 mg Tab Take 0.8 mg by mouth Daily.      calcium acetate,phosphat bind, (PHOSLO) 667 mg capsule Take 667 mg by mouth 3 (three) times daily.      cetirizine (ZYRTEC) 10 MG tablet Take 10 mg by mouth as needed.      dexlansoprazole (DEXILANT) 60 mg capsule Take 1 capsule by mouth Daily.      famotidine (PEPCID) 40 MG tablet Take 40 mg by mouth Daily.      hydrALAZINE (APRESOLINE) 50 MG tablet Take 1 tablet by mouth 3 (three) times daily.      lansoprazole (PREVACID) 30 MG capsule Take 30 mg by mouth once daily.      metoprolol tartrate (LOPRESSOR) 25 MG tablet Take 1 tablet by mouth 2 (two) times daily.      metoprolol tartrate (LOPRESSOR) 50 MG tablet Take 1 tablet by mouth 2 (two) times daily.      midodrine (PROAMATINE) 10 MG tablet Take 10 mg by mouth 3 (three) times a week.      nitroGLYCERIN (NITROSTAT) 0.4 MG SL tablet Place 0.4 mg under the tongue as needed.      potassium chloride (MICRO-K) 10 MEQ CpSR Take 10 mEq by mouth once daily.      JOSEPH CAPS 1 mg Cap Take 1 capsule by mouth once daily.       No current facility-administered medications for this visit.       Review of patient's allergies indicates:   Allergen Reactions    Ace inhibitors Anaphylaxis, Other (See Comments) and Swelling    Benazepril Swelling    Lisinopril Swelling    Vancomycin Other (See Comments)     Labs:    12/03/2024 creatinine 8.3, albumin 3.6, calcium 8.6, LFTs unremarkable, WBC count 3.8, hemoglobin 10.7, .7, platelet count 125 1000, ANC 2.09.    4/11/2024:  Creatinine 6.64, BUN 35.52, T. Bili WNL AST 35, ALT 36, , folic acide 14.03, Vitamin B12 741, WBC 4.23, Hgb 10.9, Hct 34.1, .3, Plt 102,000, SPEP with  DESIRE IgG 1552, IgA 136, IgM 192, M-Terrell not observed, No evidence of monoclonal protein, Beta 2 Microglobulin and serum viscosity pending    2024:  Creatinine 6.55, BUN 30.13, liver enzymes WNL, WBC 6.34, Hgb 11.0, Hct 34.2, Plt 91,000.     2023:  Peripheral blood smear WBCs adequate with mild absolute eosinophilla.  Adequate macrocytic/normochromic red blood cell population slightly decrease in number.  Mild thrombocytopenia.  No circulating blasts.  Creatinine 6.03, BUN 22.07, Liver enzymes WNL, folic acid 65.16, Vitamin B12 (796), WBC 7.12, Hgb 112.2, Hct 37.9, Plt 123,000.    Imagin2024:  Pancreas is normal.  Liver is normal.  Right renal cysts.     Review of systems  CONSTITUTIONAL: no fevers, no chills, no weight loss, no fatigue, no weakness  HEMATOLOGIC: no abnormal bleeding, no abnormal bruising, no drenching night sweats  ONCOLOGIC: no new masses or lumps  HEENT: no vision loss, no tinnitus or hearing loss, no nose bleeding, no dysphagia, no odynophagia  CVS: no chest pain, no palpitations, no dyspnea on exertion  RESP: no shortness of breath, no hemoptysis, no cough  GI: no nausea, no vomiting, no diarrhea, no constipation, no melena, no hematochezia, no hematemesis, no abdominal pain, no increase in abdominal girth  : no dysuria, no hematuria, no hesitancy, no scrotal swelling, no discharge  INTEGUMENT: no rashes, no abnormal bruising, no nail pitting, no hyperpigmentation  NEURO: no falls, no memory loss, no paresthesias or dysesthesias, no urofecal incontinence or retention, no loss of strength on any extremity  MSK: no back pain, no new joint pain, no joint swelling  PSYCH: no suicidal or homicidal ideation, no depression, no insomnia, no anhedonia  ENDOCRINE: no heat or cold intolerance, no polyuria, no polydipsia      Physical Exam:  Vitals:    24 1348   BP: (!) 149/76   Pulse: 67   Resp: 18   Temp: 97.7 °F (36.5 °C)       GA: AAOx3, NAD  HEENT:  moist oral mucous  membranes  RESP:  Equal chest rise, no accessory muscle use  EXT: no deformities, no pedal edema  SKIN: no rashes, warm and dry  NEURO: normal mentation, no gross neuro deficits            Assessment    # Thrombocytopenia, D 69.6  Recent diagnosis of hepatitis A and Chris Vera over the last couple of months.    11/23/2023 Hepatitis A Antibody Total  Positive.  Platelet count in September 2023 - 66,000 and trending upward in October 2023 - 126,000.  Platelet count 12/19/2023 - 123,000 and 1/2/2024 - 91,000, 4/11/2024 - 102,000        #  Anemia chronic disease, ESRD, N18.9 Z86.2  Hemoglobin in the high 10's to ll's.  Patient receives dialysis 3 days a week.  Creatinine 9.26 in October 2023.    Creatinine 12/19/2023 - 6.03, Creatinine 1/2/2024 - 6.55.    Hemoglobin 12/19/2023 - 12.2, Hemoglobin 1/2/2024 - 11.0.   Hemogloblin 10.6 (4/11/2024).      Plan   Reviewed labs, noted stable platelet count at 125 000 and hemoglobin level at 10.7 grams/deciliter.  Noted macrocytosis in the absence of vitamin B12 folic acid deficiency   Discussed with patient the concern for primary bone marrow disorders such as MDS given advanced age, macrocytosis and pancytopenia.  Given patient was stable counts, will hold off on further evaluation with bone marrow biopsy for now   Plan to follow-up in 4 months with repeat labs to discuss further treatment recommendations          Portions of the record may have been created with voice recognition software. Occasional wrong-word or sound-a-like substitutions may have occurred due to the inherent limitations of voice recognition software.

## 2025-05-19 ENCOUNTER — OFFICE VISIT (OUTPATIENT)
Dept: HEMATOLOGY/ONCOLOGY | Facility: CLINIC | Age: 89
End: 2025-05-19
Payer: COMMERCIAL

## 2025-05-19 VITALS
SYSTOLIC BLOOD PRESSURE: 109 MMHG | WEIGHT: 161.88 LBS | DIASTOLIC BLOOD PRESSURE: 69 MMHG | TEMPERATURE: 98 F | RESPIRATION RATE: 14 BRPM | HEIGHT: 63 IN | BODY MASS INDEX: 28.68 KG/M2 | OXYGEN SATURATION: 95 % | HEART RATE: 80 BPM

## 2025-05-19 DIAGNOSIS — D69.6 THROMBOCYTOPENIA: Primary | ICD-10-CM

## 2025-05-19 DIAGNOSIS — D64.9 ANEMIA, UNSPECIFIED TYPE: ICD-10-CM

## 2025-05-19 PROCEDURE — 1126F AMNT PAIN NOTED NONE PRSNT: CPT | Mod: CPTII,,, | Performed by: STUDENT IN AN ORGANIZED HEALTH CARE EDUCATION/TRAINING PROGRAM

## 2025-05-19 PROCEDURE — 3288F FALL RISK ASSESSMENT DOCD: CPT | Mod: CPTII,,, | Performed by: STUDENT IN AN ORGANIZED HEALTH CARE EDUCATION/TRAINING PROGRAM

## 2025-05-19 PROCEDURE — 99214 OFFICE O/P EST MOD 30 MIN: CPT | Mod: ,,, | Performed by: STUDENT IN AN ORGANIZED HEALTH CARE EDUCATION/TRAINING PROGRAM

## 2025-05-19 PROCEDURE — 1101F PT FALLS ASSESS-DOCD LE1/YR: CPT | Mod: CPTII,,, | Performed by: STUDENT IN AN ORGANIZED HEALTH CARE EDUCATION/TRAINING PROGRAM

## 2025-05-19 PROCEDURE — 1159F MED LIST DOCD IN RCRD: CPT | Mod: CPTII,,, | Performed by: STUDENT IN AN ORGANIZED HEALTH CARE EDUCATION/TRAINING PROGRAM

## 2025-05-19 NOTE — PROGRESS NOTES
Referring provider:    Dr. Burrows    Reason for consultation:    Thrombocytopenia.        HPI:    Ms. Schultz is a 89-year-old female referred to our clinic for evaluation of thrombocytopenia.  Review of records reveal low platelet count in July (71,000), August (101,000), and September (66,000 to 70,000) of 2023 with upward trend in October 2023 to 126,000.  White blood count was low in July (2.91) August (3.53) 2023.  White blood count normalized in September 2023 to 4.66.  Patient was diagnosed and treated for hepatitis A a couple of months ago with repeat hepatitis A still in the grey zone at the end of October 2023.  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  Her last EGD on 8/31/2023 for complaint of melena showed hiatal hernia and gastritis.  She was placed on Dexlansoprazole for gastritis.    Interval history:    Today, 05/19/2025, patient denies any acute concerns today she denies any fevers, chills, night sweats, decreased appetite or weight loss.  She reports intermittent headaches today after dialysis.  She denies any vision changes or focal weakness  She denies any new medications, ER or hospital visits since last follow-up with us.  She denies any blood in his stools, dark tarry stools, easy bruising or bleeding.      Labs:    05/14/2025 creatinine 4.4, albumin 3.8, calcium 8.5, total bili 0.4, AST 46, ALT 25, iron 71, TIBC 221, saturation 32, ferritin 910, folic acid 10.23, B12 618, WBC count 7.59, hemoglobin 10.7, .8, platelet count 117, ANC 4.4, peripheral smear with mild microcytic anemia, normal white count with mild eosinophilia, mild thrombocytopenia.    12/03/2024 creatinine 8.3, albumin 3.6, calcium 8.6, LFTs unremarkable, WBC count 3.8, hemoglobin 10.7, .7, platelet count 125 1000, ANC 2.09.    4/11/2024:  Creatinine 6.64, BUN 35.52, T. Bili WNL AST 35, ALT 36, , folic acide 14.03, Vitamin B12 741, WBC 4.23, Hgb 10.9, Hct  34.1, .3, Plt 102,000, SPEP with DESIRE IgG 1552, IgA 136, IgM 192, M-Terrell not observed, No evidence of monoclonal protein, Beta 2 Microglobulin and serum viscosity pending    2024:  Creatinine 6.55, BUN 30.13, liver enzymes WNL, WBC 6.34, Hgb 11.0, Hct 34.2, Plt 91,000.     2023:  Peripheral blood smear WBCs adequate with mild absolute eosinophilla.  Adequate macrocytic/normochromic red blood cell population slightly decrease in number.  Mild thrombocytopenia.  No circulating blasts.  Creatinine 6.03, BUN 22.07, Liver enzymes WNL, folic acid 65.16, Vitamin B12 (796), WBC 7.12, Hgb 112.2, Hct 37.9, Plt 123,000.    Imagin2024:  Pancreas is normal.  Liver is normal.  Right renal cysts.       Past Medical History:   Diagnosis Date    Anemia     Arthritis     Breast cancer     Cataract     CHF (congestive heart failure)     Diastolic heart failure, unspecified HF chronicity     End stage renal disease     Hyperparathyroidism, unspecified     Hypertension     Mixed hyperlipidemia     Unspecified sensorineural hearing loss        Past Surgical History:   Procedure Laterality Date    BREAST SURGERY Right     GALLBLADDER SURGERY      HYSTERECTOMY         Family History   Problem Relation Name Age of Onset    Stomach cancer Mother      Diabetes Mother      Brain cancer Father         Social History     Socioeconomic History    Marital status:    Tobacco Use    Smoking status: Never    Smokeless tobacco: Never   Substance and Sexual Activity    Alcohol use: Not Currently    Drug use: Never       Current Outpatient Medications   Medication Sig Dispense Refill    aspirin 325 MG tablet Take 325 mg by mouth as needed.      atorvastatin (LIPITOR) 20 MG tablet Take 20 mg by mouth nightly.      B complex-vitamin C-folic acid (NEPHRO-ALONZO) 0.8 mg Tab Take 0.8 mg by mouth Daily.      calcium acetate,phosphat bind, (PHOSLO) 667 mg capsule Take 667 mg by mouth 3 (three) times daily.      cetirizine  (ZYRTEC) 10 MG tablet Take 10 mg by mouth as needed.      dexlansoprazole (DEXILANT) 60 mg capsule Take 1 capsule by mouth Daily.      famotidine (PEPCID) 40 MG tablet Take 40 mg by mouth Daily.      lansoprazole (PREVACID) 30 MG capsule Take 30 mg by mouth once daily.      metoprolol tartrate (LOPRESSOR) 25 MG tablet Take 1 tablet by mouth 2 (two) times daily.      metoprolol tartrate (LOPRESSOR) 50 MG tablet Take 1 tablet by mouth 2 (two) times daily.      midodrine (PROAMATINE) 10 MG tablet Take 10 mg by mouth 3 (three) times a week.      nitroGLYCERIN (NITROSTAT) 0.4 MG SL tablet Place 0.4 mg under the tongue as needed.      potassium chloride (MICRO-K) 10 MEQ CpSR Take 10 mEq by mouth once daily.      JOSEPH CAPS 1 mg Cap Take 1 capsule by mouth once daily.      hydrALAZINE (APRESOLINE) 50 MG tablet Take 1 tablet by mouth 3 (three) times daily. (Patient not taking: Reported on 5/19/2025)       No current facility-administered medications for this visit.       Review of patient's allergies indicates:   Allergen Reactions    Ace inhibitors Anaphylaxis, Other (See Comments) and Swelling    Benazepril Swelling    Lisinopril Swelling    Vancomycin Other (See Comments)         Review of systems  CONSTITUTIONAL: no fevers, no chills, no weight loss, no fatigue, no weakness  HEMATOLOGIC: no abnormal bleeding, no abnormal bruising, no drenching night sweats  ONCOLOGIC: no new masses or lumps  HEENT: no vision loss, no tinnitus or hearing loss, no nose bleeding, no dysphagia, no odynophagia  CVS: no chest pain, no palpitations, no dyspnea on exertion  RESP: no shortness of breath, no hemoptysis, no cough  GI: no nausea, no vomiting, no diarrhea, no constipation, no melena, no hematochezia, no hematemesis, no abdominal pain, no increase in abdominal girth  : no dysuria, no hematuria, no hesitancy, no scrotal swelling, no discharge  INTEGUMENT: no rashes, no abnormal bruising, no nail pitting, no hyperpigmentation  NEURO:  no falls, no memory loss, no paresthesias or dysesthesias, no urofecal incontinence or retention, no loss of strength on any extremity  MSK: no back pain, no new joint pain, no joint swelling  PSYCH: no suicidal or homicidal ideation, no depression, no insomnia, no anhedonia  ENDOCRINE: no heat or cold intolerance, no polyuria, no polydipsia      Physical Exam:  Vitals:    05/19/25 1357   BP: 109/69   Pulse: 80   Resp: 14   Temp: 98 °F (36.7 °C)       GA: AAOx3, NAD  HEENT:  moist oral mucous membranes  RESP:  Equal chest rise, no accessory muscle use  EXT: no deformities, no pedal edema  SKIN: no rashes, warm and dry  NEURO: normal mentation, no gross neuro deficits            Assessment    # Thrombocytopenia, D 69.6  Recent diagnosis of hepatitis A and Chris Vera over the last couple of months.    11/23/2023 Hepatitis A Antibody Total  Positive.  Platelet count in September 2023 - 66,000 and trending upward in October 2023 - 126,000.  Platelet count 12/19/2023 - 123,000 and 1/2/2024 - 91,000, 4/11/2024 - 102,000        #  Anemia chronic disease, ESRD, N18.9 Z86.2  Hemoglobin in the high 10's to ll's.  Patient receives dialysis 3 days a week.  Creatinine 9.26 in October 2023.    Creatinine 12/19/2023 - 6.03, Creatinine 1/2/2024 - 6.55.    Hemoglobin 12/19/2023 - 12.2, Hemoglobin 1/2/2024 - 11.0.   Hemogloblin 10.6 (4/11/2024).      Plan   Reviewed labs, largely stable platelet count and hemoglobin level.   Noted macrocytosis in the absence of vitamin B12 folic acid deficiency   Discussed with patient the concern for primary bone marrow disorders such as MDS given advanced age, macrocytosis and pancytopenia.  Given stable counts, will hold off on further evaluation with bone marrow biopsy for now   Plan to follow-up in 4 months with repeat labs to discuss further treatment recommendations          Portions of the record may have been created with voice recognition software. Occasional wrong-word or sound-a-like  substitutions may have occurred due to the inherent limitations of voice recognition software.

## 2025-08-15 ENCOUNTER — TELEPHONE (OUTPATIENT)
Dept: CARDIOLOGY | Facility: HOSPITAL | Age: 89
End: 2025-08-15
Payer: COMMERCIAL

## 2025-08-18 ENCOUNTER — HOSPITAL ENCOUNTER (OUTPATIENT)
Facility: HOSPITAL | Age: 89
Discharge: HOME | End: 2025-08-18
Attending: STUDENT IN AN ORGANIZED HEALTH CARE EDUCATION/TRAINING PROGRAM | Admitting: STUDENT IN AN ORGANIZED HEALTH CARE EDUCATION/TRAINING PROGRAM
Payer: COMMERCIAL

## 2025-08-18 VITALS
HEART RATE: 92 BPM | OXYGEN SATURATION: 97 % | BODY MASS INDEX: 30.59 KG/M2 | HEIGHT: 63 IN | SYSTOLIC BLOOD PRESSURE: 153 MMHG | DIASTOLIC BLOOD PRESSURE: 113 MMHG | WEIGHT: 172.63 LBS

## 2025-08-18 DIAGNOSIS — N18.6 END STAGE RENAL DISEASE: ICD-10-CM

## 2025-08-18 DIAGNOSIS — Z99.2 ESRD ON HEMODIALYSIS: Primary | ICD-10-CM

## 2025-08-18 DIAGNOSIS — N18.6 ESRD ON HEMODIALYSIS: Primary | ICD-10-CM

## 2025-08-18 DIAGNOSIS — T82.868A THROMBOSIS OF KIDNEY DIALYSIS ARTERIOVENOUS GRAFT, INITIAL ENCOUNTER: ICD-10-CM

## 2025-08-18 LAB
ANION GAP SERPL CALC-SCNC: 19 MEQ/L
BUN SERPL-MCNC: 116 MG/DL (ref 9.8–20.1)
CALCIUM SERPL-MCNC: 6 MG/DL (ref 8.4–10.2)
CHLORIDE SERPL-SCNC: 106 MMOL/L (ref 98–107)
CO2 SERPL-SCNC: 19 MMOL/L (ref 23–31)
CREAT SERPL-MCNC: 15.49 MG/DL (ref 0.55–1.02)
CREAT/UREA NIT SERPL: 7
GFR SERPLBLD CREATININE-BSD FMLA CKD-EPI: 2 ML/MIN/1.73/M2
GLUCOSE SERPL-MCNC: 79 MG/DL (ref 82–115)
HBV SURFACE AG SERPL QL IA: NONREACTIVE
POTASSIUM SERPL-SCNC: 5.1 MMOL/L (ref 3.5–5.1)
SODIUM SERPL-SCNC: 144 MMOL/L (ref 136–145)

## 2025-08-18 PROCEDURE — C1757 CATH, THROMBECTOMY/EMBOLECT: HCPCS | Performed by: STUDENT IN AN ORGANIZED HEALTH CARE EDUCATION/TRAINING PROGRAM

## 2025-08-18 PROCEDURE — 99153 MOD SED SAME PHYS/QHP EA: CPT | Performed by: STUDENT IN AN ORGANIZED HEALTH CARE EDUCATION/TRAINING PROGRAM

## 2025-08-18 PROCEDURE — C1887 CATHETER, GUIDING: HCPCS | Performed by: STUDENT IN AN ORGANIZED HEALTH CARE EDUCATION/TRAINING PROGRAM

## 2025-08-18 PROCEDURE — 76937 US GUIDE VASCULAR ACCESS: CPT | Mod: 26,,, | Performed by: STUDENT IN AN ORGANIZED HEALTH CARE EDUCATION/TRAINING PROGRAM

## 2025-08-18 PROCEDURE — 36558 INSERT TUNNELED CV CATH: CPT | Mod: 51,LT,, | Performed by: STUDENT IN AN ORGANIZED HEALTH CARE EDUCATION/TRAINING PROGRAM

## 2025-08-18 PROCEDURE — C1769 GUIDE WIRE: HCPCS | Performed by: STUDENT IN AN ORGANIZED HEALTH CARE EDUCATION/TRAINING PROGRAM

## 2025-08-18 PROCEDURE — 77001 FLUOROGUIDE FOR VEIN DEVICE: CPT | Mod: 26,XS,, | Performed by: STUDENT IN AN ORGANIZED HEALTH CARE EDUCATION/TRAINING PROGRAM

## 2025-08-18 PROCEDURE — 25500020 PHARM REV CODE 255: Performed by: STUDENT IN AN ORGANIZED HEALTH CARE EDUCATION/TRAINING PROGRAM

## 2025-08-18 PROCEDURE — 27201423 OPTIME MED/SURG SUP & DEVICES STERILE SUPPLY: Performed by: STUDENT IN AN ORGANIZED HEALTH CARE EDUCATION/TRAINING PROGRAM

## 2025-08-18 PROCEDURE — C1894 INTRO/SHEATH, NON-LASER: HCPCS | Performed by: STUDENT IN AN ORGANIZED HEALTH CARE EDUCATION/TRAINING PROGRAM

## 2025-08-18 PROCEDURE — 80048 BASIC METABOLIC PNL TOTAL CA: CPT | Performed by: STUDENT IN AN ORGANIZED HEALTH CARE EDUCATION/TRAINING PROGRAM

## 2025-08-18 PROCEDURE — 36415 COLL VENOUS BLD VENIPUNCTURE: CPT | Performed by: STUDENT IN AN ORGANIZED HEALTH CARE EDUCATION/TRAINING PROGRAM

## 2025-08-18 PROCEDURE — C1725 CATH, TRANSLUMIN NON-LASER: HCPCS | Performed by: STUDENT IN AN ORGANIZED HEALTH CARE EDUCATION/TRAINING PROGRAM

## 2025-08-18 PROCEDURE — 76937 US GUIDE VASCULAR ACCESS: CPT | Performed by: STUDENT IN AN ORGANIZED HEALTH CARE EDUCATION/TRAINING PROGRAM

## 2025-08-18 PROCEDURE — 36905 THRMBC/NFS DIALYSIS CIRCUIT: CPT | Mod: RT,,, | Performed by: STUDENT IN AN ORGANIZED HEALTH CARE EDUCATION/TRAINING PROGRAM

## 2025-08-18 PROCEDURE — 90935 HEMODIALYSIS ONE EVALUATION: CPT

## 2025-08-18 PROCEDURE — 36905 THRMBC/NFS DIALYSIS CIRCUIT: CPT | Mod: RT | Performed by: STUDENT IN AN ORGANIZED HEALTH CARE EDUCATION/TRAINING PROGRAM

## 2025-08-18 PROCEDURE — C1750 CATH, HEMODIALYSIS,LONG-TERM: HCPCS | Performed by: STUDENT IN AN ORGANIZED HEALTH CARE EDUCATION/TRAINING PROGRAM

## 2025-08-18 PROCEDURE — 87340 HEPATITIS B SURFACE AG IA: CPT | Performed by: NURSE PRACTITIONER

## 2025-08-18 PROCEDURE — 77001 FLUOROGUIDE FOR VEIN DEVICE: CPT | Mod: XS | Performed by: STUDENT IN AN ORGANIZED HEALTH CARE EDUCATION/TRAINING PROGRAM

## 2025-08-18 PROCEDURE — 99213 OFFICE O/P EST LOW 20 MIN: CPT | Mod: 25,,, | Performed by: STUDENT IN AN ORGANIZED HEALTH CARE EDUCATION/TRAINING PROGRAM

## 2025-08-18 PROCEDURE — 99152 MOD SED SAME PHYS/QHP 5/>YRS: CPT | Performed by: STUDENT IN AN ORGANIZED HEALTH CARE EDUCATION/TRAINING PROGRAM

## 2025-08-18 PROCEDURE — 63600175 PHARM REV CODE 636 W HCPCS: Performed by: STUDENT IN AN ORGANIZED HEALTH CARE EDUCATION/TRAINING PROGRAM

## 2025-08-18 PROCEDURE — 36558 INSERT TUNNELED CV CATH: CPT | Mod: LT | Performed by: STUDENT IN AN ORGANIZED HEALTH CARE EDUCATION/TRAINING PROGRAM

## 2025-08-18 PROCEDURE — 99152 MOD SED SAME PHYS/QHP 5/>YRS: CPT | Mod: ,,, | Performed by: STUDENT IN AN ORGANIZED HEALTH CARE EDUCATION/TRAINING PROGRAM

## 2025-08-18 DEVICE — CATH GLIDEPATH 14.5F 23CM 28CM: Type: IMPLANTABLE DEVICE | Site: CHEST  WALL | Status: FUNCTIONAL

## 2025-08-18 RX ORDER — MIDAZOLAM HYDROCHLORIDE 1 MG/ML
INJECTION INTRAMUSCULAR; INTRAVENOUS
Status: DISCONTINUED | OUTPATIENT
Start: 2025-08-18 | End: 2025-08-18 | Stop reason: HOSPADM

## 2025-08-18 RX ORDER — IOPAMIDOL 755 MG/ML
INJECTION, SOLUTION INTRAVASCULAR
Status: DISCONTINUED | OUTPATIENT
Start: 2025-08-18 | End: 2025-08-18 | Stop reason: HOSPADM

## 2025-08-18 RX ORDER — LIDOCAINE HYDROCHLORIDE 10 MG/ML
INJECTION, SOLUTION INFILTRATION; PERINEURAL
Status: DISCONTINUED | OUTPATIENT
Start: 2025-08-18 | End: 2025-08-18 | Stop reason: HOSPADM

## 2025-08-18 RX ORDER — LIDOCAINE HYDROCHLORIDE AND EPINEPHRINE 10; 10 UG/ML; MG/ML
INJECTION, SOLUTION INFILTRATION; PERINEURAL
Status: DISCONTINUED | OUTPATIENT
Start: 2025-08-18 | End: 2025-08-18 | Stop reason: HOSPADM

## 2025-08-18 RX ORDER — FENTANYL CITRATE 50 UG/ML
INJECTION, SOLUTION INTRAMUSCULAR; INTRAVENOUS
Status: DISCONTINUED | OUTPATIENT
Start: 2025-08-18 | End: 2025-08-18 | Stop reason: HOSPADM

## 2025-08-18 RX ORDER — SODIUM CHLORIDE 0.9 % (FLUSH) 0.9 %
10 SYRINGE (ML) INJECTION
Status: DISCONTINUED | OUTPATIENT
Start: 2025-08-18 | End: 2025-08-18 | Stop reason: HOSPADM

## (undated) DEVICE — SUT SILK 0 BLK BR CT-1 30IN

## (undated) DEVICE — DRESSING TEGADERM CHG 3.5X4.5

## (undated) DEVICE — GUIDE LAUNCHER 8FR MP1

## (undated) DEVICE — CATH EMB FOGARTY 5.5FRX40CM

## (undated) DEVICE — ADHESIVE DERMABOND ADVANCED

## (undated) DEVICE — COVER PROBE US 5.5X58L NON LTX

## (undated) DEVICE — PAD RADI PEDIATRIC

## (undated) DEVICE — FLOWSWITCH HP 1-W W/O LL

## (undated) DEVICE — SHEATH PINNACLE 7FR HIFLO

## (undated) DEVICE — Device

## (undated) DEVICE — FORCEP HEMOSTAT MOSQUITO STR

## (undated) DEVICE — DRAPE UTILITY W/ TAPE 20X30IN

## (undated) DEVICE — TRAY CENT PREM SUT REM PK SGL

## (undated) DEVICE — KIT MINI STK MAX COAX 5FR 10CM

## (undated) DEVICE — SUT MONOCRYL 3-0 PS-2 UND

## (undated) DEVICE — BLLN DORADO 80X6X6

## (undated) DEVICE — CATH GLIDE ANGLED 5FR 65CM

## (undated) DEVICE — NDL PERC ENTRY BSDN 18-7.0

## (undated) DEVICE — DRESSING QUIKCLOT 1.5X1.5FT

## (undated) DEVICE — GUIDEWIRE STD .035X180CM ANG

## (undated) DEVICE — SHEATH PINNACLE 6FR HIFLO

## (undated) DEVICE — PRESTO INFLATION DEVICE

## (undated) DEVICE — CATH DORADO 7X8X80

## (undated) DEVICE — SHEATH BRITE TIP WIRE 8F 5.5CM